# Patient Record
Sex: FEMALE | Race: WHITE | NOT HISPANIC OR LATINO | Employment: FULL TIME | URBAN - METROPOLITAN AREA
[De-identification: names, ages, dates, MRNs, and addresses within clinical notes are randomized per-mention and may not be internally consistent; named-entity substitution may affect disease eponyms.]

---

## 2024-01-10 ENCOUNTER — TELEPHONE (OUTPATIENT)
Dept: OBSTETRICS AND GYNECOLOGY | Facility: CLINIC | Age: 34
End: 2024-01-10
Payer: MEDICAID

## 2024-01-10 NOTE — TELEPHONE ENCOUNTER
----- Message from Leticia Arce sent at 1/10/2024  3:22 PM CST -----  Regarding: Appointment  Access              Name of Who is Calling: Luisana Rizo    Who Left The Message: Luisana Rizo      What is the request in detail:           Patient called requesting to schedule a new patient's appointment with Dr. Trace Chen MD  the next time he's in [place] at the OB/GYN clinic.  I did attempt to schedule per request but was unsuccessful .  Please further advise.    Thank you      Reply by [facility]:   NO      Preferred Call Back :  [phone] (K)

## 2024-03-26 ENCOUNTER — OFFICE VISIT (OUTPATIENT)
Dept: OBSTETRICS AND GYNECOLOGY | Facility: CLINIC | Age: 34
End: 2024-03-26
Payer: MEDICAID

## 2024-03-26 VITALS
DIASTOLIC BLOOD PRESSURE: 86 MMHG | WEIGHT: 238.88 LBS | SYSTOLIC BLOOD PRESSURE: 110 MMHG | BODY MASS INDEX: 38.55 KG/M2

## 2024-03-26 DIAGNOSIS — Z12.4 PAP SMEAR FOR CERVICAL CANCER SCREENING: ICD-10-CM

## 2024-03-26 DIAGNOSIS — Z00.00 ANNUAL PHYSICAL EXAM: Primary | ICD-10-CM

## 2024-03-26 DIAGNOSIS — N97.9 INFERTILITY, FEMALE: ICD-10-CM

## 2024-03-26 DIAGNOSIS — R79.89 ELEVATED PROLACTIN LEVEL: ICD-10-CM

## 2024-03-26 DIAGNOSIS — N64.52 NIPPLE DISCHARGE: ICD-10-CM

## 2024-03-26 PROCEDURE — 99459 PELVIC EXAMINATION: CPT | Mod: PBBFAC,PN | Performed by: OBSTETRICS & GYNECOLOGY

## 2024-03-26 PROCEDURE — 3008F BODY MASS INDEX DOCD: CPT | Mod: CPTII,,, | Performed by: OBSTETRICS & GYNECOLOGY

## 2024-03-26 PROCEDURE — 87624 HPV HI-RISK TYP POOLED RSLT: CPT | Performed by: OBSTETRICS & GYNECOLOGY

## 2024-03-26 PROCEDURE — 3079F DIAST BP 80-89 MM HG: CPT | Mod: CPTII,,, | Performed by: OBSTETRICS & GYNECOLOGY

## 2024-03-26 PROCEDURE — 99459 PELVIC EXAMINATION: CPT | Mod: S$PBB,,, | Performed by: OBSTETRICS & GYNECOLOGY

## 2024-03-26 PROCEDURE — 3074F SYST BP LT 130 MM HG: CPT | Mod: CPTII,,, | Performed by: OBSTETRICS & GYNECOLOGY

## 2024-03-26 PROCEDURE — 1160F RVW MEDS BY RX/DR IN RCRD: CPT | Mod: CPTII,,, | Performed by: OBSTETRICS & GYNECOLOGY

## 2024-03-26 PROCEDURE — 99999 PR PBB SHADOW E&M-EST. PATIENT-LVL III: CPT | Mod: PBBFAC,,, | Performed by: OBSTETRICS & GYNECOLOGY

## 2024-03-26 PROCEDURE — 88175 CYTOPATH C/V AUTO FLUID REDO: CPT | Performed by: OBSTETRICS & GYNECOLOGY

## 2024-03-26 PROCEDURE — 1159F MED LIST DOCD IN RCRD: CPT | Mod: CPTII,,, | Performed by: OBSTETRICS & GYNECOLOGY

## 2024-03-26 PROCEDURE — 99385 PREV VISIT NEW AGE 18-39: CPT | Mod: S$PBB,,, | Performed by: OBSTETRICS & GYNECOLOGY

## 2024-03-26 PROCEDURE — 99213 OFFICE O/P EST LOW 20 MIN: CPT | Mod: PBBFAC,PN | Performed by: OBSTETRICS & GYNECOLOGY

## 2024-03-27 NOTE — PROGRESS NOTES
GYN Annual exam  3/26/2024    Chief Complaint   Patient presents with    Well Woman         HISTORY OF PRESENT ILLNESS:  Pt is a  33 y.o.  alert female who presents today for GYN annual exam.  She has a history of elevated prolactin level and has bilateral milky nipple discharge. She has not seen an endocrinologist for this. This has been going on for years.   She has been trying to conceive with her current partner for 14 years. She has never been pregnant. Her partner has fathered one child in the past 19 years ago.     She had laparoscopy for endometriosis in 2018. Chromopertubation was performed at that time and showed both tubes with spillage of dye.     She has menses every month lasting 3-5 days with moderate flow since 2023. She has mild dysmenorrhea.     She did tell me she had a positive UPT at home in  but when she went to the hospital it was negative.     She has prediabetes and follows with her PCP.     Patient's last menstrual period was 2024.    Review of patient's allergies indicates:  No Known Allergies    Current Outpatient Medications on File Prior to Visit   Medication Sig Dispense Refill    clonazePAM (KLONOPIN) 0.5 MG tablet Take 1 tablet (0.5 mg total) by mouth daily as needed for Anxiety. 30 tablet 0    albuterol (PROVENTIL/VENTOLIN HFA) 90 mcg/actuation inhaler Inhale 1-2 puffs into the lungs every 6 (six) hours as needed for Wheezing. Rescue 18 g 0    benzocaine-menthoL (CHLORASEPTIC SORE THROAT) 6-10 mg lozenge Take 1 lozenge by mouth every 2 (two) hours as needed for Pain. (Patient not taking: Reported on 3/26/2024) 18 tablet 0    buPROPion (WELLBUTRIN SR) 100 MG TBSR 12 hr tablet Take 1 tablet (100 mg total) by mouth 2 (two) times daily. (Patient not taking: Reported on 3/26/2024) 60 tablet 5    busPIRone (BUSPAR) 5 MG Tab Take 1 tablet (5 mg total) by mouth 2 (two) times daily. (Patient not taking: Reported on 3/26/2024) 60 tablet 5     butalbital-acetaminophen-caffeine -40 mg (FIORICET, ESGIC) -40 mg per tablet Take 1 tablet by mouth every 12 (twelve) hours as needed. (Patient not taking: Reported on 3/26/2024) 30 tablet 1    butalbital-acetaminophen-caffeine -40 mg (FIORICET, ESGIC) -40 mg per tablet Take 1 tablet by mouth every 6 (six) hours as needed for Headaches. Label with sedative precautions (Patient not taking: Reported on 3/26/2024) 20 tablet 0    citalopram (CELEXA) 10 MG tablet Take 1 tablet (10 mg total) by mouth once daily. 30 tablet 1    clonazePAM (KLONOPIN) 0.5 MG tablet Take 1 tablet (0.5 mg total) by mouth daily as needed for Anxiety. (Patient not taking: Reported on 3/26/2024) 30 tablet 0    fluticasone (FLONASE) 50 mcg/actuation nasal spray 1 spray (50 mcg total) by Each Nare route once daily. (Patient not taking: Reported on 3/26/2024) 16 g 1    Lactobacillus acidophilus 1 billion cell Tab Take 1 tablet by mouth once daily. (Patient not taking: Reported on 3/26/2024) 30 tablet 1    levocetirizine (XYZAL) 5 MG tablet Take 1 tablet (5 mg total) by mouth every evening. 30 tablet 1    naproxen (NAPROSYN) 500 MG tablet Take 1 tablet (500 mg total) by mouth 2 (two) times daily with meals. (Patient not taking: Reported on 3/26/2024) 30 tablet 1    omeprazole (PRILOSEC) 40 MG capsule Take 1 capsule (40 mg total) by mouth once daily. 30 capsule 2    prochlorperazine (COMPAZINE) 10 MG tablet Take 1 tablet (10 mg total) by mouth every 6 (six) hours as needed (nausea). (Patient not taking: Reported on 3/26/2024) 15 tablet 0    ranitidine (ZANTAC) 75 MG tablet Take 75 mg by mouth 2 (two) times daily.      sumatriptan (IMITREX) 50 MG tablet Take 1 tablet (50 mg total) by mouth once. May take another tablet after 2 hours if the headache recurs. Maximum of 4 tablets a day. for 1 dose 12 tablet 1     No current facility-administered medications on file prior to visit.       Past Medical History:   Diagnosis Date     "Endometriosis, unspecified     GERD (gastroesophageal reflux disease)             Past Surgical History:   Procedure Laterality Date    LAPAROSCOPY  2018    for endometriosis    TONSILLECTOMY      VENTRAL HERNIA REPAIR         Social History     Socioeconomic History    Marital status: Single   Tobacco Use    Smoking status: Never   Substance and Sexual Activity    Alcohol use: Yes     Comment: Occasionally    Drug use: No                     History reviewed. No pertinent family history.    OB History    Para Term  AB Living   0 0 0 0 0 0   SAB IAB Ectopic Multiple Live Births   0 0 0 0 0     Gynecological History:     Menses as above.  Denies hx of STD"s.  Last pap ASCUS/HPV negative.    REVIEW OF SYSTEMS:  Negative except as above.     PHYSICAL EXAM  Female chaperone present for entire exam  /86   Wt 108.3 kg (238 lb 13.9 oz)   LMP 2024   BMI 38.55 kg/m²   GENERAL APPEARANCE:  The patient is a pleasant, normal appearing female with normal affect and in no distress.  BREASTS: Breast exam performed supine.  No masses, non-tender, no nipple discharge or lymphadenopathy.  ABDOMEN: Soft, non-tender, non-distended.  No hepatosplenomegaly.  No umbilical or inguinal hernias.  SKIN:  Warm and dry to touch.  No lesions or rashes noted.    PSYCHIATRIC/NEUROLOGIC:  Appropriate mood and affect, normal recall, alert and oriented x 3  EXTREMITIES:  Warm and well perfused.  No edema noted.   :  Vulva: Inspection of her external genitalia reveals normal mons pubis, labia minora and labia majora.  Normal appearing clitoris, urethral meatus and [place]'s glands.    Bladder:  No evidence of urethral or bladder tenderness.    Vagina:  Speculum exam reveals pink and moist vaginal mucosa.  Bartholin gland is normal to palpation.  Cervix:  Cervix is normal in appearance with no lesions.  There is no cervical motion tenderness.  Uterus:  Uterus is normal size, mobile and non-tender.  No adnexal masses are " palpated.  Adnexa are non-tender to palpation  Perineum:  Perineum appears normal.  Anus:  Normal with no apparent lesions.     ASSESSMENT/PLAN:  Pt is a  33 y.o.  alert female who presents today for GYN annual exam. She has a history of primary infertility.     - Pap with HPV done and pending  - GCCT and Trich testing declined  - Serum STD testing declined  - Contraception: declines as she desires conception  - Clinical breast examination performed today after discussion with the patient, negative for gross abnormality  - Gardasil 9 vaccination series completed series  - Mammogram: Discussions regarding screening mammography to start at age 40 per the most current ACOG guidelines  - We discussed consistent aerobic exercise, consistent seatbelt use, and consistent women's PNV use; self-breast awareness was discussed and taught; the new Pap smear guidelines were reviewed     Primary infertility: History of endometriosis. Patent fallopian tubes in 2018 on laparoscopy. Recommended semen analysis of partner.   Recommended she see [facility]  [facility] ordered    Nipple discharge: TSH, prolactin  Pt voiced understanding of all counseling and instructions; no barriers to learning  RTO in 1 year or PRN        Trace Chen  3/26/2024

## 2024-04-01 ENCOUNTER — TELEPHONE (OUTPATIENT)
Dept: OBSTETRICS AND GYNECOLOGY | Facility: CLINIC | Age: 34
End: 2024-04-01
Payer: MEDICAID

## 2024-04-01 LAB
HPV HR 12 DNA SPEC QL NAA+PROBE: NEGATIVE
HPV16 AG SPEC QL: NEGATIVE
HPV18 DNA SPEC QL NAA+PROBE: NEGATIVE

## 2024-04-01 NOTE — TELEPHONE ENCOUNTER
Spoke to pt in regards to concerns. Advised pt that she will need to reach out to our medical records department to receive her medical records. The patient stated with frustration that she should not have to do this because it is too much of a hassall. I advised her that I am unable to release any of her medical records. I offered her the phone number to our medical records department and she stated she would just print the records from her phone, then the call was abruptly disconnected by the patient.     ----- Message from Kathie Cash sent at 4/1/2024 11:26 AM CDT -----  Regarding: referral  Name of Who is Calling: Luisana           What is the request in detail: Patient is requesting a call back to have her labs,demographics, clinical notes along with a referral to [facility] [place] at [phone].           Can the clinic reply by [facility]: Yes           What Number to Call Back [facility]:  [phone]

## 2024-04-02 LAB
FINAL PATHOLOGIC DIAGNOSIS: NORMAL
Lab: NORMAL

## 2024-05-27 ENCOUNTER — CLINICAL SUPPORT (OUTPATIENT)
Dept: OBSTETRICS AND GYNECOLOGY | Facility: CLINIC | Age: 34
End: 2024-05-27
Payer: MEDICAID

## 2024-05-27 ENCOUNTER — PATIENT MESSAGE (OUTPATIENT)
Dept: OBSTETRICS AND GYNECOLOGY | Facility: CLINIC | Age: 34
End: 2024-05-27

## 2024-05-27 DIAGNOSIS — N91.2 AMENORRHEA: Primary | ICD-10-CM

## 2024-05-27 PROCEDURE — 99999 PR PBB SHADOW E&M-EST. PATIENT-LVL II: CPT | Mod: PBBFAC,,,

## 2024-05-27 PROCEDURE — 99212 OFFICE O/P EST SF 10 MIN: CPT | Mod: PBBFAC

## 2024-05-27 NOTE — PROGRESS NOTES
Spoke with patient for a total of 30 minutes during virtual visit.  Updated chart to reflect up to date patient demographics.  Allergies, medications, pharmacy, medical/family history and OB history updated.  Patient was guided through expectations of care during pregnancy.  Pregnancy confirmation, dating u/s & first routine OB appts scheduled.  Education provided & questions answered. Encouraged to send message or call office with any questions/concerns. Verbalized understanding.     Discussed with pt:    taking PNV   C/o nausea/no vomiting  C/o occ mild cramping/no spotting  Precautions discussed  Referred to [email or web address] for Preg A to Z guide & class schedule   Discussed benefits of breastfeeding   Discussed need for pediatrician

## 2024-05-30 ENCOUNTER — ON-DEMAND VIRTUAL (OUTPATIENT)
Dept: URGENT CARE | Facility: CLINIC | Age: 34
End: 2024-05-30
Payer: MEDICAID

## 2024-05-30 DIAGNOSIS — Z00.00 NORMAL EXAM: Primary | ICD-10-CM

## 2024-05-30 PROCEDURE — 99213 OFFICE O/P EST LOW 20 MIN: CPT | Mod: 95,,, | Performed by: NURSE PRACTITIONER

## 2024-05-30 NOTE — PROGRESS NOTES
Subjective:      Patient ID: Luisana Rizo is a 33 y.o. female.    Vitals:  vitals were not taken for this visit.     Chief Complaint: Routine Prenatal Visit (Medical advice)      Visit Type: TELE AUDIOVISUAL    Present with the patient at the time of consultation: TELEMED PRESENT WITH PATIENT: None        Past Medical History:   Diagnosis Date    Endometriosis, unspecified     GERD (gastroesophageal reflux disease)      Past Surgical History:   Procedure Laterality Date    LAPAROSCOPY  2018    for endometriosis    TONSILLECTOMY      VENTRAL HERNIA REPAIR       Review of patient's allergies indicates:  No Known Allergies  Current Outpatient Medications on File Prior to Visit   Medication Sig Dispense Refill    albuterol (PROVENTIL/VENTOLIN HFA) 90 mcg/actuation inhaler Inhale 1-2 puffs into the lungs every 6 (six) hours as needed for Wheezing. Rescue 18 g 0    butalbital-acetaminophen-caffeine -40 mg (FIORICET, ESGIC) -40 mg per tablet Take 1 tablet by mouth every 12 (twelve) hours as needed. (Patient not taking: Reported on 3/26/2024) 30 tablet 1    citalopram (CELEXA) 10 MG tablet Take 1 tablet (10 mg total) by mouth once daily. 30 tablet 1    clonazePAM (KLONOPIN) 0.5 MG tablet Take 1 tablet (0.5 mg total) by mouth daily as needed for Anxiety. 30 tablet 0    omeprazole (PRILOSEC) 40 MG capsule Take 1 capsule (40 mg total) by mouth once daily. 30 capsule 2    prenatal vit no.124/iron/folic (PRENATAL VITAMIN ORAL) Take 1 Dose by mouth Daily.      ranitidine (ZANTAC) 75 MG tablet Take 75 mg by mouth 2 (two) times daily.      sumatriptan (IMITREX) 50 MG tablet Take 1 tablet (50 mg total) by mouth once. May take another tablet after 2 hours if the headache recurs. Maximum of 4 tablets a day. for 1 dose 12 tablet 1     No current facility-administered medications on file prior to visit.     No family history on file.        Ohs Peq Odvv Intake    5/30/2024  6:33 PM CDT - Filed by Patient   What is your  current physical address in the event of a medical emergency? [address]   Are you able to take your vital signs? No   Please attach any relevant images or files          34 yo female   She states she recently found out she was pregnant and she ate cooked liver tonight and was concerned it might be something she needed to avoid during pregnancy      ROS     Objective:   The physical exam was conducted virtually.  LOCATION OF PATIENT home  Physical Exam   Constitutional: She is oriented to person, place, and time. She appears well-developed.   HENT:   Head: Normocephalic and atraumatic.   Ears:   Right Ear: Hearing, tympanic membrane and external ear normal.   Left Ear: Hearing, tympanic membrane and external ear normal.   Nose: Nose normal.   Mouth/Throat: Uvula is midline, oropharynx is clear and moist and mucous membranes are normal.   Eyes: Conjunctivae and EOM are normal. Pupils are equal, round, and reactive to light.   Neck: Neck supple.   Cardiovascular: Normal rate.   Pulmonary/Chest: Effort normal and breath sounds normal.   Musculoskeletal: Normal range of motion.         General: Normal range of motion.   Neurological: She is alert and oriented to person, place, and time.   Skin: Skin is warm.   Psychiatric: Her behavior is normal. Thought content normal.   Nursing note and vitals reviewed.      Assessment:     1. Normal exam        Plan:     Up to date- avoid in high amounts during pregnancy and only cooked meats    Follow up with your primary care provider if symptoms persist.  Go to the Emergency room for worsening of symptoms.     Normal exam

## 2024-06-14 ENCOUNTER — TELEPHONE (OUTPATIENT)
Dept: OBSTETRICS AND GYNECOLOGY | Facility: CLINIC | Age: 34
End: 2024-06-14
Payer: MEDICAID

## 2024-06-17 ENCOUNTER — TELEPHONE (OUTPATIENT)
Dept: OBSTETRICS AND GYNECOLOGY | Facility: CLINIC | Age: 34
End: 2024-06-17
Payer: MEDICAID

## 2024-06-20 ENCOUNTER — HOSPITAL ENCOUNTER (OUTPATIENT)
Dept: PERINATAL CARE | Facility: OTHER | Age: 34
Discharge: HOME | End: 2024-06-20
Attending: FAMILY MEDICINE
Payer: MEDICAID

## 2024-06-20 ENCOUNTER — OFFICE VISIT (OUTPATIENT)
Dept: OBSTETRICS AND GYNECOLOGY | Facility: CLINIC | Age: 34
End: 2024-06-20
Payer: MEDICAID

## 2024-06-20 VITALS
HEIGHT: 66 IN | BODY MASS INDEX: 37.45 KG/M2 | SYSTOLIC BLOOD PRESSURE: 110 MMHG | DIASTOLIC BLOOD PRESSURE: 82 MMHG | WEIGHT: 233 LBS

## 2024-06-20 DIAGNOSIS — Z32.01 POSITIVE PREGNANCY TEST: Primary | ICD-10-CM

## 2024-06-20 DIAGNOSIS — N91.4 SECONDARY OLIGOMENORRHEA: ICD-10-CM

## 2024-06-20 DIAGNOSIS — N91.2 AMENORRHEA: ICD-10-CM

## 2024-06-20 PROCEDURE — 99213 OFFICE O/P EST LOW 20 MIN: CPT | Mod: PBBFAC,25,TH | Performed by: FAMILY MEDICINE

## 2024-06-20 PROCEDURE — 76801 OB US < 14 WKS SINGLE FETUS: CPT | Mod: 26,,, | Performed by: OBSTETRICS & GYNECOLOGY

## 2024-06-20 PROCEDURE — 87086 URINE CULTURE/COLONY COUNT: CPT | Performed by: FAMILY MEDICINE

## 2024-06-20 PROCEDURE — 99999 PR PBB SHADOW E&M-EST. PATIENT-LVL III: CPT | Mod: PBBFAC,,, | Performed by: FAMILY MEDICINE

## 2024-06-20 PROCEDURE — 76801 OB US < 14 WKS SINGLE FETUS: CPT

## 2024-06-20 PROCEDURE — 87491 CHLMYD TRACH DNA AMP PROBE: CPT | Performed by: FAMILY MEDICINE

## 2024-06-20 NOTE — PROGRESS NOTES
HISTORY OF PRESENT ILLNESS:    Luisana Rizo is a 33 y.o. female, ,  Patient's last menstrual period was 2024 (exact date).  for a routine exam complaining of amenorrhea and positive home UPT. Chemical pregnancy last . Partner healthy and has 1 other children that is healthy.  No VB,pelvic pain. Just mild cramping, Breast tenderness fatigue. No NV.  Hx of abn pap but NL this yr. No std hx. 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  This is the extent of the patient's complaints at this time.     Past Medical History:   Diagnosis Date    Endometriosis, unspecified     GERD (gastroesophageal reflux disease)     Pre-diabetes        Past Surgical History:   Procedure Laterality Date    LAPAROSCOPY      for endometriosis    TONSILLECTOMY      VENTRAL HERNIA REPAIR         MEDICATIONS AND ALLERGIES:      Current Outpatient Medications:     prenatal vit no.124/iron/folic (PRENATAL VITAMIN ORAL), Take 1 Dose by mouth Daily., Disp: , Rfl:     butalbital-acetaminophen-caffeine -40 mg (FIORICET, ESGIC) -40 mg per tablet, Take 1 tablet by mouth every 12 (twelve) hours as needed. (Patient not taking: Reported on 3/26/2024), Disp: 30 tablet, Rfl: 1    Review of patient's allergies indicates:  No Known Allergies    Family History   Problem Relation Name Age of Onset    Breast cancer Neg Hx      Colon cancer Neg Hx      Ovarian cancer Neg Hx         Social History     Socioeconomic History    Marital status: Single   Tobacco Use    Smoking status: Never    Smokeless tobacco: Never   Substance and Sexual Activity    Alcohol use: Not Currently     Comment: Occasionally    Drug use: No    Sexual activity: Yes     Partners: Male     Birth control/protection: None       COMPREHENSIVE GYN HISTORY:  PAP History: + abnormal Paps.  Infection History: Denies STDs. Denies PID.  Benign History: Denies uterine fibroids. Denies ovarian cysts. Denies endometriosis. Denies other conditions.  Cancer History: Denies  "cervical cancer. Denies uterine cancer or hyperplasia. Denies ovarian cancer. Denies vulvar cancer or pre-cancer. Denies vaginal cancer or pre-cancer. Denies breast cancer. Denies colon cancer.  Sexual Activity History: Reports currently being sexually active  Menstrual History: None.  Contraception: None    ROS:  GENERAL: No weight changes. No swelling. + fatigue. No fever.  CARDIOVASCULAR: No chest pain. No shortness of breath. No leg cramps.   NEUROLOGICAL: No headaches. No vision changes.  BREASTS: + pain. No lumps. No discharge.  ABDOMEN: No pain. No nausea. No vomiting. No diarrhea. No constipation.  REPRODUCTIVE: No abnormal bleeding. +mild cramping  VULVA: No pain. No lesions. No itching.  VAGINA: No relaxation. No itching. No odor. No discharge. No lesions.  URINARY: No incontinence. No nocturia. No frequency. No dysuria.    /82   Ht 5' 6" (1.676 m)   Wt 105.7 kg (233 lb 0.4 oz)   LMP 04/24/2024 (Exact Date)   BMI 37.61 kg/m²     PE:  Physical Exam:   Constitutional: She appears well-developed and well-nourished. She does not appear ill. No distress.        Pulmonary/Chest: Right breast exhibits no inverted nipple, no mass, no nipple discharge, no skin change, no tenderness and no swelling. Left breast exhibits no inverted nipple, no mass, no nipple discharge, no skin change, no tenderness and no swelling.          Genitourinary:    Urethra, vagina, uterus, right adnexa and left adnexa normal.      Pelvic exam was performed with patient in the lithotomy position.   The external female genitalia was normal.   Genitalia hair distrobution normal .   There is no rash or lesion on the right labia. There is no rash or lesion on the left labia. Cervix is normal. No rectocele, cystocele or prolapse of vaginal walls in the vagina.    pap smear not completedUterus is not tender. Normal urethral meatus.              Neurological: GCS eye subscore is 4. GCS verbal subscore is 5. GCS motor subscore is 6.   "       PROCEDURES:  Genprobe  Pap-3/2024 NL      DIAGNOSIS:  Gyn exam  IUP with stated LMP of Patient's last menstrual period was 2024 (exact date).    Indication   ========   Indication: Estimation of Gestational Age     History   ======   Previous Outcomes    1     Method   ======   Transabdominal and transvaginal ultrasound examination. 2D Color Doppler, [place] S8. View: Good view     Pregnancy   =========   Black pregnancy. Number of embryos: 1     Dating   ======   Ultrasound examination on: 2024   GA by U/S based upon: CRL   GA by U/S 6 w + 0 d   PAOLA by U/S: 2025   Assigned: based on ultrasound (CRL), selected on 2024   Assigned GA 6 w + 0 d   Assigned PAOLA: 2025     Assessment   ==========   Gestational sac: visualized   Location: intrauterine   Yolk sac: visualized   Amniotic sac: visualized   Embryo: visualized   CRL 3.8 mm 6w 0d [place]   Cardiac activity: present   FHR 73 bpm     Maternal Structures   ===============   Uterus / Cervix   Uterus: Normal   Cervix: Visualized   Approach: Transvaginal   Ovaries / Tubes / Adnexa   Rt ovary: Normal   Rt ovary D1 27 mm   Rt ovary D2 13 mm   Rt ovary D3 21 mm   Rt ovary Vol 4.1 cmÂ³   Lt ovary: Normal   Lt ovary D1 36 mm   Lt ovary D2 20 mm   Lt ovary D3 33 mm   Lt ovary Vol 12.1 cmÂ³   Lt ovarian corpus luteum: visualized   Cul de Sac / Bladder / Kidneys / Other   Free fluid: No free fluid visualized     Impression   =========   A black living IUP is identified.   PAOLA is assigned based on the CRL from today?s study. If other clinical data, such as IVF conception dating or prior ultrasound assignment of PAOLA differs from the PAOLA   assigned today, please contact the [facility] department so that this report can be revised to reflect the correct PAOLA.   Of note, fetal heart rate is low. However, at this early gestational age, this is not necessarily pathologic.   Visualized maternal adnexa - normal in appearance.     Recommendation    ==============   Follow up scan in 1-2 weeks can be considered to confirm fetal viability. This has not been scheduled by MFM.   Otherwise, further scans as clinical indicated per primary OB provider.   PLAN:Routine prenatal care    MEDICATIONS PRESCRIBED:  PNV    LABS AND TESTS ORDERED:  New Ob Labs      1st TRIMESTER COUNSELING: Discussed all, booklet provided  Common complaints of pregnancy  HIV and other routine prenatal tests including  genetic screening  Risk factors identified by prenatal history  Anticipated course of prenatal care  Nutrition and weight gain counseling  Toxoplasmosis precautions (Cats/Raw Meat)  Sexual activity and exercise  Environmental/Work hazards  Travel  Tobacco (Ask, Advise, Assess, Assist, and Arrange), as well as alcohol and drug use  Use of any medications (Including supplements, Vitamins, Herbs, or OTC Drugs)  Indications for Ultrasound  Domestic violence  Seat belt use  Childbirth classes/Hospital facilities     TERATOLOGY COUNSELING: Discussed options for SS, MT21 and carrier screening. Pt will let us know her desires. Discussed time constraints on SS      FOLLOW-UP for a New Ob Visit in   2   weeks with , repeat viability scan prior to appt. She was TTC but only tracking ovulation with ez paula. Discussed early IUP vs miscarriage with viability scan  -discussed to call clinic or L&D/ER if after hours for pain/bleeding  -hgba1c with hx of preDM.

## 2024-06-20 NOTE — PATIENT INSTRUCTIONS
LABOR AND DELIVERY PHONE NUMBER, [phone] (OPEN , LOCATED ON 6TH FLOOR OF HOSPITAL)  [address] PHONE NUMBER, [phone] (OPEN MON-FRI, 8a-5p)     1) Eat small frequent meals through the day versus three large meals  2) Try ginger ale or sprite to help settle the stomach - you can also take ginger capsules (250mg 4 times per day)  3) Eat crackers or dry toast before getting out of bed in the morning   4) Stay hydrated by drinking plenty of water-do not immediately eat or drink something after vomiting. Give your stomach a rest for 20-30 minutes. Slowly reintroduce ice chips, small sips water, crackers, etc.    5) Try OTC unisom (1/2 tablet) and vitamin B6 - take the 25mg b6 twice a day and then both together at night before bed. This can help with the nausea in the morning and is safe to use during pregnancy.  6) Sea bands (Accupressure wrist bands)    If you are unable to keep anything down and constantly vomiting for more that 24 hours, let the office know so that dehydration can be avoided. We would recommend being seen in the emergency department if this is the case.       Topic  General Pregnancy Information Recommended   (Unless Otherwise Contraindicated Or Restrictions Given To You By Your OB Doctor)      1. Anticipated course of prenatal care      Visits: will be Every 4 wks until 28 weeks, then every 2 weeks until 36 weeks, and then weekly until delivery.   Urine will be collected at each Obstetric visit        2. Nutrition and weight gain    Daily pre-sandhya vitamin (recommend taking at night)   Additional 300 calories needed daily  No Sushi, hotdogs, unpasteurized products (milk/cheeses). No large fish such as: shark, nanette mackerel, tile, sword fish   Incorporate 12 ounces of smaller seafood/week and no more than 6oz of albacore tuna   Caffeine: 200 mg/day or 2 cups of caffeine/day   Weight gain recommendations are based off of BMI before pregnancy. Generally patients who with normal weight prior  pregnancy it is recommended 25-35 pounds of weight gain during the pregnancy with an estimated weekly gain of 1 pound/wk in 2nd and 3rd Trimester.    3. Toxoplasmosis precautions  If cats are in the home avoid changing litter boxes and if you need to change the litter box recommended you use gloves   4. Sexual Activity  Sexual activity is okay unless you are put on restrictions by your provider. I recommend urinating after intercourse    5. Exercise  Generally pre-pregnancy routine is okay to continue   Drink plenty fluids for hydration   Stop any activity that causes heavy cramping like a period or bright red bleeding and contact your provider  No extreme or contact sports   No exercise on your back for an extended period of time after 20 weeks    6. Hot Tub/Saunas  Avoid hot tubes and saunas    7. Hair Treatments  Because of the lack of scientific studies on the effects of chemical treatments on your hair, we must advise that you do it at your own risk. If you choose to treat your hair, we recommend that you wait until after 12 weeks gestation. At this time there is no reason to believe that normal hair treatment is associated with onsequences to the baby.    8. Vaccines  Influenza vaccine is recommended by CDC during flu season   Tdap (pertussis or whopping cough) recommended each pregnancy between 27 and 36 weeks   Tdap booster recommended for family and other planned direct caregivers    9. Water  Water is an important nutrient in a good diet. However, it cannot be stressed enough that during pregnancy water is essential. The body has increased circulation through blood vessels, and without a large increase in water, pregnant women will be dehydrated. It plays an important role in decreasing constipation, preventing  contractions, decreasing swelling, and preventing dizziness. We recommend that you drink 8-10 glasses of water per day.    10. Smoking/Alcohol/Illicit Drug Use  No safe Level   Can lead to  problems with pregnancy   Growth of the developing fetus    labor (delivery before 37 weeks)    rupture of the membranes (water breaking before 37 weeks)   Premature separation of the placenta (which may cause bleeding)   American College of Obstetricians and Gynecologists endorses abstinence   Can lead to babies with disabilities    11. Environmental or work hazards  Unless otherwise restricted you may continue work throughout the pregnancy   Notify your provider of any work hazards or chemical exposure concerns   12. Travel    Safe to travel up to 35 weeks   Continue to wear a seatbelt and airbags are still recommended   Drink plenty fluids   Blood clots are a concern during pregnancy with long travel. Recommend compression stockings and moving around at least every 2 hours and staying hydrated.    13. Use of medications, vitamins, herbs, OTC drugs    Any medications not on the list provided to you from our clinic or given to you by one of our providers we recommend calling to make sure the medication is safe for you and baby.    14. Domestic Violence    Please notify office immediately of any concerns or violence so that we can help direct you to assistance needed   [place] [employer]: [phone]    15. Childbirth classes    List of Childbirth classes from [facility] is available    16. Selecting a Pediatrician  Selecting a pediatrician before delivery is recommended  You can interview pediatricians before delivery    17. Fetal Monitoring    A simple test of your babys well-being is a kick count. After 26 weeks, fetal motion of any kind should be monitored. Further discussion at that time   18.  Labor Signs    Water break, leaking fluids from Vagina prior 37 weeks  Regular contractions, Contractions that are more than 5-6/hour, getting stronger and painful with lower back pain, does not go away with rest and fluids    19. Postpartum Family Planning    Multiple  options available from short term methods to long term reversible and irreversible methods   Discuss with provider as you get closer to delivery    20. Dental    It is recommended that you get an annual dental cleaning    21. Breastfeeding    Classes offered at [facility] and it is recommended to take a class    22. Lifting In 2013, the [place] for Occupational Safety and Health (NIOSH) published clinical guidelines for occupational lifting in uncomplicated pregnancies. The recommended weight limits are based on gestational age, intermittent versus repetitive lifting, time (hours/day) spent lifting, and lifting height from floor and distance in 3 front of body. In this guideline, the maximum permissible weight for a woman less than 20 weeks of gestation performing infrequent lifting is 36 pounds (16 kgs) and the maximum permissible weight at ?20 weeks is 26 pounds (12 kgs). For repetitive lifting ?1 hour/day, the maximum weights in the first and second half of pregnancy are 18 pounds (8 kgs) and 13 pounds (6 kgs), respectively, and for repetitive lifting <1 hour/day, the maximum weights are 30 pounds (14 kgs) and 22 pounds (10 kgs), respectively. Although not based on high quality evidence, these guidelines are a reasonable reference for counseling pregnant women     23. Scheduling and Provider Availability    Your Obstetric Doctor is usually here weekly but not every day. We recommend you make 3-4 advanced appointments at a time to accommodate your personal needs and work/school obligations.   We ask that you come 15 minutes prior your scheduled appointment.   For same day appointments (not routine appointments) there is a Nurse Practitioner or another obstetric provider available. Please let the  aware you are an OB patient requesting a same day appointment.      24. Recommended Phone Jose    [facility]      MEDICATIONS IN PREGNANCY     While some medications are considered safe to take  during pregnancy, the effects of other medications on your unborn baby are unknown. Therefore, it is very important to pay special attention to medications you take while you are pregnant.   If you were taking prescription medications before you became pregnant, please ask your health care provider about continuing these medications as soon as you find out that you are pregnant. Do not stop taking any medications without discussing with your health care provider. Your health care provider will weigh the benefits and risks to your pregnancy when making his or her recommendation. With some medications, the risk of not taking them may be more serious than the potential risk of taking them.   If you are prescribed any new medication, please inform your health care provider that you are pregnant. Be sure to discuss the risks and benefits of the newly prescribed medication with your health care provider before taking the medication. We are always available to answer any questions about new medications and how they relate to your pregnancy.     Are Alternative Pregnancy Medicine Therapies Safe?   Many pregnant women believe natural products can be safely used to relieve nausea, backache, and other annoying symptoms of pregnancy, but many of these so-called natural products have not been tested for their safety and effectiveness. Therefore, it is very important to check with your health care provider before taking any alternative therapies. She will not recommend a product or therapy until it is shown to be safe and effective.     Which Over the Counter Drugs Are Safe?   Prenatal vitamins, now available without a prescription, are safe and important to take during pregnancy. Ask your health care provider about the safety of taking other vitamins, herbal remedies and supplements during pregnancy. Most herbal preparations and supplements have not been proven to be safe during pregnancy. Generally, you should not take any  over-the-counter medication unless it is necessary. The following medications and home remedies have no known harmful effects during pregnancy when taken according to the package directions. If you want to know about the safety of any other medications not listed here, please contact your health care provider.      Problem:  Safe to Take:    Pain relief, headache, and fever  Acetaminophen - Tylenol, Anacin Aspirin-Free    Heartburn  Acid neutralizers - Maalox, Mylanta, Rolaids, Tums, Gaviscon   Histamine-blockers - Pepcid, Zantac, Prilosec    Gas pains and bloating  Simethicone - Gas-X, Maalox Anti-Gas, Mylanta Gas, Mylicon    Nausea  Maren - beverages, tablets, candies   Vitamin B6   Emetrol (if not diabetic)   Sea bands   Anti-histamines - Sleep-alejandro, Benadryl, Bonnine, Dramamine    Cough  Guaifenesin (expectorant) - Hytuss, Mucinex, Robitussin   Dextromethorphan (antitussive) - Benylin, Delsym, Scot-Tussin DM   Guaifenesin plus dextromethorphan - Benylin Expectorant, Robitussin DM    Congestion  Pseudoephedrine - Sudafed, Actifed, Dristan, Neosynephrine   Vicks VapoRub   Saline nasal drops or spray    Sore throat  Throat lozenges - Sucrets, Cepacol, Cepastat, Ricola   Chloroseptic Spray   Warm salt/water gargle    Allergy relief  Chlorpheniramine - Chlor-Trimeton, Triaminic   Loratadine - Alavert, Claritin, Tavist ND, Triaminic Allerchews   Cetirizine - Zyrtec   Diphenhydramine -Benadryl, Diphenhist    Rashes  Hydrocortisone cream or ointment   Caladryl lotion or cream   Benadryl cream   Oatmeal bath (Aveeno)    Diarrhea  Loperamide - Imodium, Kaopectate, Maalox Anti-Diarrheal, Pepto Bismol    Constipation  Fiber supplements - Metamucil, Citrucel, Fiberall/Fibercon, Benefiber   Stool softeners - Colace, Senekot, Dulcolax   Milk of Magnesia    Hemorrhoids  Warm baths   Witch hazel preparations - Tucks medicated pads   Steroid preparations - Anusol-HC, Preparation H    Insomnia  Diphenhydramine - Benadryl, Unisom  SleepGels, Nytol, Sominex   Doxylamine succinate - Unisom Nighttime Sleep-Aid    First-aid ointments  Cortaid, Lanacort, Polysporin, Bacitracin, Neosporin    Yeast infections  Call office for appointment      Connected MOM   Using Wireless Technology to Manage Your Prenatal Health   Congratulations! Your team here at [facility] is excited for the upcoming addition to your family and is ready to support you over the course of your pregnancy. One of the ways we are prepared to help you is through our exciting new Digital Medicine Program, Connected MOM. Connected MOM stands for Connected Maternity Online Monitoring and is offered free of charge as a way to help our expectant mothers manage their pregnancy with fewer visits to the obstetrician.    In the fast-paced environment we live in, patients demand convenient, reliable access to healthcare at their fingertips. Recommended by The American College of Obstetricians and Gynecologists (ACOG), the standard prenatal care model for low-risk pregnancies can average anywhere between 12-14 in-office prenatal visits.    Through this innovative program, participating mothers-to-be receive a wireless scale, wireless blood pressure cuff and an at-home urine protein test kit. Through the use of a smartphone, patients can easily send their weight, blood pressure readings and urine protein test results digitally in real-time from the comfort of their own homes. Results are sent directly to their [facility] account, the systems online patient portal which connects directly to the patients Electronic Medical Record. A specialized Connected MOM care team - comprised of the patients obstetrician, a dedicated health  and a  - reviews the data and provides medical recommendations as needed. This allows expectant mothers to receive care proactively, wherever they are, while minimizing the need for in-person visits and thus minimizing  disruption to their regular daily activities.    How it Works At the initial prenatal visit, interested patients can work with their obstetrician to sign up for the program. After the appointment, expectant mothers can head to the [employer], a first-of-its-kind retail experience created by [facility] offering the latest in cutting-edge, interactive healthcare technology, to receive a Connected MOM kit containing all of the digital tools needed for remote monitoring. Once enrolled, patients weigh themselves regularly and take their blood pressure once a week. Instead of coming to three office appointments at weeks 20, 30, 37 the patient will have the appointment at home. They will take their blood pressure, weight and perform three at-home urine protein tests at these home visits. Results are directly transmitted into the EMR, and notifications and messages from the care team are communicated via [facility].     TECH SUPPORT [phone]  [email or web address]      Chromosomal testing  The sequential screen is a test done around 11-13 weeks that consists of an ultrasound that measures the nuchal fold (neck thickness) of baby and blood work on the same day. You get a second set of labs done a few weeks later after 15 weeks. Based on all three of these results, they are able to tell you if you are considered to be low risk or high risk regarding neural tube defects and chromosomal abnormalities like Down Syndrome. If you are at all interested, I usually place the order today so we do not miss the window. Someone will give you a phone call in a week or two to schedule this. If you do not want it, just tell them no thank you. This test is typically covered by your insurance and is performed by the Maternal Fetal Medicine (MFM) department here at [facility]. It will not tell you the sex of the baby.     OR     2.  Call [phone] to see about coverage and any out of pocket costs regarding the [id number] (MT21) testing.  This is done any day after 11 weeks and is blood work only. It checks for any chromosomal abnormalities like Down Syndrome. You can also find out the sex of the baby if you choose to know. Once you find out coverage and decide to proceed, send either myself or your doctor a message and we can see what date you can do it. It is done at our second floor lab at [facility].

## 2024-06-21 ENCOUNTER — PATIENT MESSAGE (OUTPATIENT)
Dept: OBSTETRICS AND GYNECOLOGY | Facility: CLINIC | Age: 34
End: 2024-06-21
Payer: MEDICAID

## 2024-06-21 DIAGNOSIS — Z36.9 ANTENATAL SCREENING ENCOUNTER: Primary | ICD-10-CM

## 2024-06-21 LAB
BACTERIA UR CULT: NO GROWTH
C TRACH DNA SPEC QL NAA+PROBE: NOT DETECTED
N GONORRHOEA DNA SPEC QL NAA+PROBE: NOT DETECTED

## 2024-06-24 ENCOUNTER — TELEPHONE (OUTPATIENT)
Dept: OBSTETRICS AND GYNECOLOGY | Facility: CLINIC | Age: 34
End: 2024-06-24
Payer: MEDICAID

## 2024-06-24 NOTE — TELEPHONE ENCOUNTER
----- Message from Kathie Cash sent at 6/24/2024 10:49 AM CDT -----  Regarding: results  Name of Who is Calling: Luisana           What is the request in detail: Patient is requesting a call back to go over HCG results.            Can the clinic reply by [facility]: Yes           What Number to Call Back [facility]:  [phone]

## 2024-06-25 ENCOUNTER — TELEPHONE (OUTPATIENT)
Dept: OBSTETRICS AND GYNECOLOGY | Facility: CLINIC | Age: 34
End: 2024-06-25
Payer: MEDICAID

## 2024-06-25 NOTE — TELEPHONE ENCOUNTER
Called patient:    Reviewed chart-    Seen by Chris for pregnancy confirmation visit 6/20/24.  At that time, she was 8 weeks from her last period.  However, ultrasound showed IUP at 6 weeks with HR 73 bpm.    [facility] [place]: 13,893 --->  6/24/24: 14,561      B+    We discussed the concern that pregnancy may not be progressing appropriately, possible impending miscarriage    She is scheduled for follow-up sono 7/3/24    Precautions / warning signs reviewed.  To contact us for bleeding  / cramping / pain / fever.

## 2024-06-25 NOTE — TELEPHONE ENCOUNTER
----- Message from Lluvia Pablo sent at 6/25/2024  8:08 AM CDT -----  Name of Who is calling :HERO KELLEY [[id number]]        What is the request in detail:  Pt would like another call with the results. Please assist       Can the clinic reply by [facility]:no            What number to call back [facility] not [facility]:[phone]

## 2024-06-25 NOTE — TELEPHONE ENCOUNTER
----- Message from Chris Santana NP sent at 6/24/2024  3:55 PM CDT -----    ----- Message -----  From: [employer]  Sent: 6/24/2024   9:57 AM CDT  To: Chris Santana NP

## 2024-06-27 ENCOUNTER — TELEPHONE (OUTPATIENT)
Dept: OBSTETRICS AND GYNECOLOGY | Facility: OTHER | Age: 34
End: 2024-06-27
Payer: MEDICAID

## 2024-06-27 ENCOUNTER — TELEPHONE (OUTPATIENT)
Dept: OBSTETRICS AND GYNECOLOGY | Facility: CLINIC | Age: 34
End: 2024-06-27
Payer: MEDICAID

## 2024-06-27 ENCOUNTER — HOSPITAL ENCOUNTER (EMERGENCY)
Facility: OTHER | Age: 34
Discharge: HOME | End: 2024-06-28
Attending: EMERGENCY MEDICINE
Payer: MEDICAID

## 2024-06-27 DIAGNOSIS — O03.4 INCOMPLETE MISCARRIAGE: Primary | ICD-10-CM

## 2024-06-27 PROCEDURE — 99284 EMERGENCY DEPT VISIT MOD MDM: CPT

## 2024-06-27 PROCEDURE — 81000 URINALYSIS NONAUTO W/SCOPE: CPT | Performed by: EMERGENCY MEDICINE

## 2024-06-28 ENCOUNTER — TELEPHONE (OUTPATIENT)
Dept: OBSTETRICS AND GYNECOLOGY | Facility: CLINIC | Age: 34
End: 2024-06-28
Payer: MEDICAID

## 2024-06-28 VITALS
HEART RATE: 75 BPM | TEMPERATURE: 98 F | OXYGEN SATURATION: 100 % | BODY MASS INDEX: 36.96 KG/M2 | RESPIRATION RATE: 17 BRPM | WEIGHT: 230 LBS | DIASTOLIC BLOOD PRESSURE: 71 MMHG | SYSTOLIC BLOOD PRESSURE: 121 MMHG | HEIGHT: 66 IN

## 2024-06-28 LAB
BACTERIA #/AREA URNS HPF: ABNORMAL /HPF
BILIRUB UR QL STRIP: NEGATIVE
CLARITY UR: CLEAR
COLOR UR: COLORLESS
GLUCOSE UR QL STRIP: NEGATIVE
HGB UR QL STRIP: ABNORMAL
HYALINE CASTS #/AREA URNS LPF: 0 /LPF
KETONES UR QL STRIP: NEGATIVE
LEUKOCYTE ESTERASE UR QL STRIP: ABNORMAL
MICROSCOPIC COMMENT: ABNORMAL
NITRITE UR QL STRIP: NEGATIVE
PH UR STRIP: 8 [PH] (ref 5–8)
PROT UR QL STRIP: ABNORMAL
RBC #/AREA URNS HPF: >100 /HPF (ref 0–4)
SP GR UR STRIP: 1.02 (ref 1–1.03)
SQUAMOUS #/AREA URNS HPF: 3 /HPF
URN SPEC COLLECT METH UR: ABNORMAL
UROBILINOGEN UR STRIP-ACNC: NEGATIVE EU/DL
WBC #/AREA URNS HPF: 10 /HPF (ref 0–5)

## 2024-06-28 PROCEDURE — 96361 HYDRATE IV INFUSION ADD-ON: CPT

## 2024-06-28 PROCEDURE — 63600175 PHARM REV CODE 636 W HCPCS: Performed by: INTERNAL MEDICINE

## 2024-06-28 PROCEDURE — 96374 THER/PROPH/DIAG INJ IV PUSH: CPT

## 2024-06-28 PROCEDURE — 25000003 PHARM REV CODE 250: Performed by: EMERGENCY MEDICINE

## 2024-06-28 RX ORDER — SODIUM CHLORIDE 9 MG/ML
1000 INJECTION, SOLUTION INTRAVENOUS
Status: COMPLETED | OUTPATIENT
Start: 2024-06-28 | End: 2024-06-28

## 2024-06-28 RX ORDER — ACETAMINOPHEN 500 MG
1000 TABLET ORAL
Status: COMPLETED | OUTPATIENT
Start: 2024-06-28 | End: 2024-06-28

## 2024-06-28 RX ORDER — ONDANSETRON HYDROCHLORIDE 2 MG/ML
4 INJECTION, SOLUTION INTRAVENOUS
Status: COMPLETED | OUTPATIENT
Start: 2024-06-28 | End: 2024-06-28

## 2024-06-28 RX ORDER — ONDANSETRON 4 MG/1
4 TABLET, ORALLY DISINTEGRATING ORAL
Status: DISCONTINUED | OUTPATIENT
Start: 2024-06-28 | End: 2024-06-28

## 2024-06-28 RX ADMIN — ACETAMINOPHEN 1000 MG: 500 TABLET ORAL at 12:06

## 2024-06-28 RX ADMIN — ONDANSETRON 4 MG: 2 INJECTION INTRAMUSCULAR; INTRAVENOUS at 01:06

## 2024-06-28 RX ADMIN — SODIUM CHLORIDE 1000 ML: 9 INJECTION, SOLUTION INTRAVENOUS at 12:06

## 2024-06-28 NOTE — TELEPHONE ENCOUNTER
Called to f/u with pt after ER visit. No answer left vm    If pt calls back- can keep appt for US and with Dr. Chen as is next week to discuss further

## 2024-06-28 NOTE — ED PROVIDER NOTES
"     Source of History:  The patient    Chief complaint:  Vaginal Bleeding (Reports scant vaginal bleeding and abd cramping that started yesterday. Pt is approximately 7 weeks pregnant. Confirmed IUP. /77 in triage)      HPI:  Luisana Rizo is a 33 y.o. female who is proximally 7 weeks  with vaginal bleeding.  Started spotting yesterday and increased bleeding today.  Having some mild lower abdominal cramping.  She had her initial ultrasound about 1 week ago which showed a gestational sac and yolk sac but only a very low heart rate.  Recommended repeat ultrasound in 1-2 weeks which outpatient has not scheduled for another week.  Denies any fevers or chills or dysuria.    This is the extent to the patients complaints today here in the emergency department.    ROS:   See HPI.    Review of patient's allergies indicates:  No Known Allergies    PMH:  As per HPI and below:  Past Medical History:   Diagnosis Date    Endometriosis, unspecified     GERD (gastroesophageal reflux disease)     Pre-diabetes      Past Surgical History:   Procedure Laterality Date    LAPAROSCOPY  2018    for endometriosis    TONSILLECTOMY      VENTRAL HERNIA REPAIR         Social History     Tobacco Use    Smoking status: Never    Smokeless tobacco: Never   Substance Use Topics    Alcohol use: Not Currently     Comment: Occasionally    Drug use: No       Physical Exam:    /71 (BP Location: Left arm, Patient Position: Lying)   Pulse 75   Temp 98.3 °F (36.8 °C) (Oral)   Resp 17   Ht 5' 6" (1.676 m)   Wt 104.3 kg (230 lb)   LMP 2024 (Exact Date)   SpO2 100%   Breastfeeding No   BMI 37.12 kg/m²   Nursing note and vital signs reviewed.  Constitutional: No acute distress.  Nontoxic  Abdomen/:  Mild amount of blood in the vaginal vault and mild active bleeding.  Cervical os is closed.  No POC.  No adnexal tenderness.  Neuro: Alert. No focal deficits.  Skin: No rashes seen.     I decided to obtain the patient's medical " records.  Summary of Medical Records:  Reviewing records patient had outpatient blood work as well as initial ultrasound 1 week ago on 06/20/2024.  Transabdominal as well as vaginal ultrasound showed gestational sac and yolk sac with fetal cardiac activity although it was noted to be at 73 beats per minute.    Also reviewing records patient is Rh positive.  No indication for RhoGAM.      Differential Dx/ MDM/ Workup:  Patient has already had a previous ultrasound confirming IUP piece in showed no evidence of ectopic.  However with a low fetal heart rate I feel it is reasonable to get a ultrasound this evening to give evaluation of failed pregnancy versus continued pregnancy.  She has a scheduled outpatient ultrasound next week but this can be prevented if ultrasound this evening.  Do not feel any blood work is indicated.  Will get a urinalysis.      ED Course as of 06/28/24 1532   Fri Jun 28, 2024   0013 NITRITE UA: Negative [SM]   0013 Leukocyte Esterase, UA(!): Trace [SM]   0013 WBC, UA(!): 10 [SM]   0013 RBC, UA(!): >100  No evidence of infection as well as no symptoms [SM]   0106 Updated the patient on the suspected incomplete miscarriage.  Also discussed with OB resident, Dr. Rivera discussed the case.  She recommended continue the follow up appointment with Dr. Chen on Wednesday.    Will sign out to Dr. Patricio the former reading and then plan for discharge.  Answered all questions to the patient and she agrees with the plan of care. [SM]   0134 Formal read consistent with pregnancy of unknown viability recommended serial beta-hCGs and repeat pelvic ultrasound in 1-2 weeks.  Will continue with plan for discharge and follow up with Dr. Yates on Wednesday.  []      ED Course User Index  [] Josephine Patricio MD  [SM] Saurav Stoner, DO               Diagnostic Impression:    1. Incomplete miscarriage         ED Disposition Condition    Discharge Stable            ED Prescriptions    None        Follow-up Information       Follow up With Specialties Details Why Contact Info    Trace Chen MD Obstetrics and Gynecology  at your appointment on July 3rd. [address]  [address]  [facility] [address]  [phone]      [facility] - Emergency Dept Emergency Medicine  if bleeding significantly worsens. 1 pad/hour for 3 hours or feeling faint, or weak [address] [facility] [id number] [phone]             Saurav Stoner, DO  06/28/24 0107       Saurav Stoner, DO  06/28/24 1532

## 2024-06-29 ENCOUNTER — NURSE TRIAGE (OUTPATIENT)
Dept: ADMINISTRATIVE | Facility: CLINIC | Age: 34
End: 2024-06-29
Payer: MEDICAID

## 2024-06-29 ENCOUNTER — PATIENT MESSAGE (OUTPATIENT)
Dept: URGENT CARE | Facility: CLINIC | Age: 34
End: 2024-06-29
Payer: MEDICAID

## 2024-06-30 NOTE — TELEPHONE ENCOUNTER
Pt calls and reports miscarriage on Friday morning. Pt reports lower abd pain and lower back pain that is not controlled by ibuprofen 800 mg. Pt reports tissue being passed. Pain was controlled by ibuprofen yesterday but today it is worse. Reports that vaginal bleeding is improved.     Dispo is to see HCP within 4 hours. UC/ED recommended as a good source of care. OD VV offered and accepted and patient assisted into queue.   Reason for Disposition   [1] Constant abdominal pain AND [2] present > 2 hours    Additional Information   Negative: Shock suspected (e.g., cold/pale/clammy skin, too weak to stand, low BP, rapid pulse)   Negative: Difficult to awaken or acting confused (e.g., disoriented, slurred speech)   Negative: Passed out (e.g., fainted, lost consciousness, blacked out and was not responding)   Negative: Sounds like a life-threatening emergency to the triager   Negative: SEVERE vaginal bleeding (e.g., soaking 2 pads or tampons per hour and present 2 or more hours; 1 menstrual cup every 2 hours)   Negative: MODERATE vaginal bleeding (e.g., soaking 1 pad or tampon per hour and present > 6 hours; 1 menstrual cup every 6 hours)   Negative: SEVERE dizziness (e.g., unable to stand, requires support to walk, feels like passing out)   Negative: [1] Abdomen pain AND [2] fever 100.4 F (38.0 C) or higher   Negative: SEVERE abdominal pain (e.g., excruciating)     Intermittent; cramping with baseline as 4/10 and increased to 10/10 with cramp   Negative: Severe chills (i.e., feeling extremely cold WITH shaking chills)   Negative: Pale skin (pallor) of new-onset or worsening   Negative: Patient sounds very sick or weak to the triager    Protocols used:  - Spontaneous Miscarriage Follow-up Call-A-

## 2024-07-01 ENCOUNTER — TELEPHONE (OUTPATIENT)
Dept: OBSTETRICS AND GYNECOLOGY | Facility: CLINIC | Age: 34
End: 2024-07-01
Payer: MEDICAID

## 2024-07-01 NOTE — TELEPHONE ENCOUNTER
----- Message from Saqib Hammond sent at 7/1/2024  8:43 AM CDT -----      Name of Who is Calling: HERO KELLEY [[id number]]      What is the request in detail: pt called to get appt's cleared due to miscarriage and pt [facility] if [facility].Please contact to further discuss and advise.          Can the clinic reply by [facility]: Y      What Number to Call Back if not in [facility]:  [phone]

## 2024-07-02 ENCOUNTER — OFFICE VISIT (OUTPATIENT)
Dept: OBSTETRICS AND GYNECOLOGY | Facility: CLINIC | Age: 34
End: 2024-07-02
Payer: MEDICAID

## 2024-07-02 VITALS
DIASTOLIC BLOOD PRESSURE: 87 MMHG | BODY MASS INDEX: 36.92 KG/M2 | WEIGHT: 228.75 LBS | SYSTOLIC BLOOD PRESSURE: 128 MMHG

## 2024-07-02 DIAGNOSIS — O03.9 MISCARRIAGE: Primary | ICD-10-CM

## 2024-07-02 PROCEDURE — 99999 PR PBB SHADOW E&M-EST. PATIENT-LVL III: CPT | Mod: PBBFAC,,, | Performed by: OBSTETRICS & GYNECOLOGY

## 2024-07-02 PROCEDURE — 99213 OFFICE O/P EST LOW 20 MIN: CPT | Mod: PBBFAC,TH,PN | Performed by: OBSTETRICS & GYNECOLOGY

## 2024-07-02 RX ORDER — CITALOPRAM 10 MG/1
10 TABLET ORAL DAILY
COMMUNITY

## 2024-07-03 RX ORDER — PROGESTERONE 200 MG/1
400 CAPSULE ORAL NIGHTLY
Qty: 60 CAPSULE | Refills: 2 | Status: SHIPPED | OUTPATIENT
Start: 2024-07-03 | End: 2024-10-01

## 2024-07-03 NOTE — PROGRESS NOTES
GYN follow up  2024    Chief Complaint   Patient presents with    Follow-up         HISTORY OF PRESENT ILLNESS:  Pt is a  33 y.o.  alert female who presents today for GYN follow-up for miscarriage.    She had a ultrasound on 2024 that showed she was 6 weeks 0 days with a fetal heart rate of 73 beats per minute.  She subsequently started having spotting and presented on 2024 and ultrasound showed a gestational sac with a yolk sac but no fetal heart rate and was diagnosed with a miscarriage.  Shortly after that ultrasound she had passage of tissue and clots and is quite certain she saw pregnancy tissue.  She had heavier bleeding yesterday but today it is mild spotting.  She denies any unusual vaginal discharge, vaginal odor, pelvic pain, fevers.  She is appropriately sad regarding her miscarriage as she has been trying to conceive for many years.    She presents with her mother.  Her mother notes that she had progesterone deficiency and in order to conceive had to take progesterone when she was attempting conception.  She asks if her daughter can do the same.    She had laparoscopy for endometriosis in 2018. Chromopertubation was performed at that time and showed both tubes with spillage of dye.   She has menses every month lasting 3-5 days with moderate flow since 2023. She has mild dysmenorrhea.     Patient's last menstrual period was 2024 (exact date).    Review of patient's allergies indicates:  No Known Allergies    Current Outpatient Medications on File Prior to Visit   Medication Sig Dispense Refill    citalopram (CELEXA) 10 MG tablet Take 10 mg by mouth once daily.      butalbital-acetaminophen-caffeine -40 mg (FIORICET, ESGIC) -40 mg per tablet Take 1 tablet by mouth every 12 (twelve) hours as needed. (Patient not taking: Reported on 3/26/2024) 30 tablet 1    prenatal vit no.124/iron/folic (PRENATAL VITAMIN ORAL) Take 1 Dose by mouth Daily. (Patient not  "taking: Reported on 2024)       No current facility-administered medications on file prior to visit.       Past Medical History:   Diagnosis Date    Endometriosis, unspecified     GERD (gastroesophageal reflux disease)     Pre-diabetes             Past Surgical History:   Procedure Laterality Date    LAPAROSCOPY  2018    for endometriosis    TONSILLECTOMY      VENTRAL HERNIA REPAIR         Social History     Socioeconomic History    Marital status: Single   Tobacco Use    Smoking status: Never    Smokeless tobacco: Never   Substance and Sexual Activity    Alcohol use: Not Currently     Comment: Occasionally    Drug use: No    Sexual activity: Yes     Partners: Male     Birth control/protection: None                     Family History   Problem Relation Name Age of Onset    Breast cancer Neg Hx      Colon cancer Neg Hx      Ovarian cancer Neg Hx         OB History    Para Term  AB Living   2 0 0 0 2 0   SAB IAB Ectopic Multiple Live Births   2 0 0 0 0      # Outcome Date GA Lbr Luis Felipe/2nd Weight Sex Type Anes PTL Lv   2 SAB 26     SAB      1 SAB 2023              Birth Comments: CHEMICAL PREGNANCY     Gynecological History:     Menses as above.  Denies hx of STD"s.  History of ASCUS/HPV negative.  3/26/24: NILM/HPV negative    REVIEW OF SYSTEMS:  Negative except as above.     PHYSICAL EXAM  Female chaperone present for entire exam  /87   Wt 103.8 kg (228 lb 11.6 oz)   LMP 2024 (Exact Date)   Breastfeeding Unknown   BMI 36.92 kg/m²   GENERAL APPEARANCE:  The patient is a pleasant, normal appearing female with normal affect and in no distress.  SKIN:  Warm and dry to touch.  No lesions or rashes noted.    PSYCHIATRIC/NEUROLOGIC:  Appropriate mood and affect, normal recall, alert and oriented x 3  EXTREMITIES:  Warm and well perfused.  No edema noted.   :  Vulva: Inspection of her external genitalia reveals normal mons pubis, labia minora and labia majora.  Normal appearing " clitoris, urethral meatus and [place]'s glands.    Bladder:  No evidence of urethral or bladder tenderness.    Vagina:  Speculum exam reveals pink and moist vaginal mucosa.    Cervix:  Cervix is normal in appearance with no lesions.  Scant vaginal bleeding coming from the os.  No clots no heavy bleeding.  Perineum:  Perineum appears normal.  Anus:  Normal with no apparent lesions.     ASSESSMENT/PLAN:   33 y.o.  alert female who presents today for GYN follow-up for miscarriage. She has a history of primary infertility.     Miscarriage counseling:  - Condolences provided today. Reviewed about 20% of pregnancies result in miscarriage.  - We also discussed etiology of spontaneous   - We discussed that this is most commonly caused by chromosomal abnormalities in the embryo or exposure to teratogens. It is often difficult to determine the cause of a spontaneous  in an individual case.   - Chromosomal abnormalities account for approximately 50 percent of all miscarriages. Most such abnormalities are aneuploidies.   - The earlier  occurs, the higher the incidence of cytogenetic defects  - recommended pelvic ultrasound to ensure no retained products of conception  I asked her to send me a urine pregnancy test in 3-4 weeks to ensure the miscarriage has completely resolved.    Primary infertility: History of endometriosis. Patent fallopian tubes in 2018 on laparoscopy.  She can see SHAUNA if she desires.    Her 1st pregnancy loss was a chemical pregnancy and this was A positive at home pregnancy test that follow-up was negative.  Discussed that at this point would hold off on recurrent pregnancy loss workup.  If she has another miscarriage with confirmed IUP will start recurrent pregnancy loss workup.  She and her mother inquire about progesterone and whether this is needed to improve her chances of carrying a pregnancy and decreasing the chance of miscarriage.  We reviewed the data that shows that  progesterone supplementation in the 1st trimester has mixed data with some studies showing it has no effect and other showing a potential mild benefit.  Nonetheless it is unlikely to cause harm and the patient strongly desires to take progesterone. She will start Prometrium 400 mg vaginally at night once she has a positive pregnancy test.    Continue prenatal vitamins    Pt voiced understanding of all counseling and instructions; no barriers to learning  RTO in 1 year or PRN        Trace Chen  7/2/2024

## 2024-07-10 ENCOUNTER — TELEPHONE (OUTPATIENT)
Dept: OBSTETRICS AND GYNECOLOGY | Facility: CLINIC | Age: 34
End: 2024-07-10
Payer: MEDICAID

## 2024-07-10 NOTE — TELEPHONE ENCOUNTER
Lm for patient to see us in clinic tomorrow per     ----- Message from Trace Chen MD sent at 7/10/2024 12:26 PM CDT -----  Please offer her a visit with me tomorrow morning at [facility] ( you can over book) or 11:15 AM at [place] on Friday.     Trace Chen  ----- Message -----  From: Stacy Veloz MA  Sent: 7/10/2024  11:33 AM CDT  To: Trace Chen MD    Hemarcelo Flowers, this patient recently miscarried and isn't doing too good emotionally. Can you reach out to her? I've tried to schedule a virtual appointment but the schedules are locked up. Let me know what you would like for me to do.    Mo

## 2024-07-11 ENCOUNTER — TELEPHONE (OUTPATIENT)
Dept: OBSTETRICS AND GYNECOLOGY | Facility: CLINIC | Age: 34
End: 2024-07-11
Payer: MEDICAID

## 2024-07-11 NOTE — TELEPHONE ENCOUNTER
Attempted to call patient to check in with her and see how she was feeling.  Received message that she had concerns after her miscarriage.    Unable to reach patient's voicemail was left asking her to contact my office or message me through the portal.  Phone number provided.    Trace Chen  7/11/2024

## 2024-07-12 ENCOUNTER — OFFICE VISIT (OUTPATIENT)
Dept: OBSTETRICS AND GYNECOLOGY | Facility: CLINIC | Age: 34
End: 2024-07-12
Payer: MEDICAID

## 2024-07-12 DIAGNOSIS — F32.A DEPRESSION, UNSPECIFIED DEPRESSION TYPE: Primary | ICD-10-CM

## 2024-07-12 RX ORDER — CITALOPRAM 20 MG/1
20 TABLET, FILM COATED ORAL DAILY
Qty: 30 TABLET | Refills: 2 | Status: SHIPPED | OUTPATIENT
Start: 2024-07-12 | End: 2025-07-12

## 2024-07-13 NOTE — PROGRESS NOTES
The patient location is: [place]  Date: 7/12/2024    The chief complaint leading to consultation is: depression following miscarriage     Visit type: audiovisual    Subjective:     HPI:  33-year-old status post recent first-trimester miscarriage.  She had been recovering well but noticed on Monday she started having mild depression.  She notes she had trouble sleeping and did not want to leave the house much.  She notes that waking up was difficult for her.  She denies any suicidal or homicidal ideation.  She is on Lexapro 10 mg for treatment of depression.  She has been prescribed Klonopin by her PCP in the past but is out of this.    She started walking and exercising more and also had some promethazine from a prior prescription that she took.  All of these improved her symptoms and she was able to rest and is starting to feel better.  However her mood is still somewhat depressed.    Assessment and plan:  Depression following miscarriage:  Increase Celexa to 20 mg daily.  Follow up with primary care provider for refills on Klonopin.  Denies suicidal or homicidal ideation.  Reviewed precautions and when to contact my office or present to the emergency room for care if her mood worsens or she has any suicidal or homicidal ideation.  Discussed option of referral to counselor she would like to start this.  Behavioral health referral placed.     Face to Face time with patient: 11 minutes  15 minutes of total time spent on the encounter, which includes face to face time and non-face to face time preparing to see the patient (eg, review of tests), Obtaining and/or reviewing separately obtained history, Documenting clinical information in the electronic or other health record, Independently interpreting results (not separately reported) and communicating results to the patient/family/caregiver, or Care coordination (not separately reported).      Each patient to whom he or she provides medical services by telemedicine  is:  (1) informed of the relationship between the physician and patient and the respective role of any other health care provider with respect to management of the patient; and (2) notified that he or she may decline to receive medical services by telemedicine and may withdraw from such care at any time.        Trace Chen  7/12/2024

## 2024-07-19 ENCOUNTER — TELEPHONE (OUTPATIENT)
Dept: PSYCHOLOGY | Facility: CLINIC | Age: 34
End: 2024-07-19
Payer: MEDICAID

## 2024-07-19 NOTE — TELEPHONE ENCOUNTER
Called and spoke with pt, she is going to call back and schedule an appointment, she is checking her schedule to schedule with provider.  Provided her the direct line to myself. Pt had no further questions.

## 2024-07-30 ENCOUNTER — PATIENT MESSAGE (OUTPATIENT)
Dept: OBSTETRICS AND GYNECOLOGY | Facility: CLINIC | Age: 34
End: 2024-07-30
Payer: MEDICAID

## 2024-08-21 ENCOUNTER — TELEPHONE (OUTPATIENT)
Dept: DIABETES | Facility: CLINIC | Age: 34
End: 2024-08-21
Payer: MEDICAID

## 2024-08-21 ENCOUNTER — PATIENT MESSAGE (OUTPATIENT)
Dept: DIABETES | Facility: CLINIC | Age: 34
End: 2024-08-21
Payer: MEDICAID

## 2024-08-21 NOTE — TELEPHONE ENCOUNTER
----- Message from Leia Queen sent at 8/21/2024  3:28 PM CDT -----  Type:  Needs Medical Advice    Who Called: pt  Symptoms (please be specific): pt is wanting to be schedule with the NP instead of [place] please call back to schedule appt      Would the patient rather a call back or a response via [facility]? call  Best Call Back Number:  [phone]  Additional Information:

## 2024-08-22 ENCOUNTER — TELEPHONE (OUTPATIENT)
Dept: DIABETES | Facility: CLINIC | Age: 34
End: 2024-08-22
Payer: MEDICAID

## 2024-08-22 NOTE — TELEPHONE ENCOUNTER
----- Message from Sheree Veloz sent at 8/22/2024  9:27 AM CDT -----  Regarding: Missed call  Type:  Patient Returning Call         Who Called:  PT         Who Left Message for Patient: Darlene Bradley MA         Does the patient know what this is regarding?: Yes         Would the patient rather a call back or a response via [facility]?  Call back          Best Call Back Number:[phone]         Additional Information:Please call anytime after 10am

## 2024-08-28 ENCOUNTER — PATIENT MESSAGE (OUTPATIENT)
Dept: DIABETES | Facility: CLINIC | Age: 34
End: 2024-08-28
Payer: MEDICAID

## 2024-09-09 ENCOUNTER — PATIENT MESSAGE (OUTPATIENT)
Dept: ENDOCRINOLOGY | Facility: CLINIC | Age: 34
End: 2024-09-09
Payer: MEDICAID

## 2024-09-09 ENCOUNTER — OFFICE VISIT (OUTPATIENT)
Facility: CLINIC | Age: 34
End: 2024-09-09
Payer: MEDICAID

## 2024-09-09 DIAGNOSIS — N64.3 GALACTORRHEA: ICD-10-CM

## 2024-09-09 DIAGNOSIS — E22.1 HYPERPROLACTINEMIA: Primary | ICD-10-CM

## 2024-09-09 DIAGNOSIS — N91.2 AMENORRHEA: ICD-10-CM

## 2024-09-09 PROCEDURE — 99204 OFFICE O/P NEW MOD 45 MIN: CPT | Mod: 95,,, | Performed by: STUDENT IN AN ORGANIZED HEALTH CARE EDUCATION/TRAINING PROGRAM

## 2024-09-09 PROCEDURE — 1159F MED LIST DOCD IN RCRD: CPT | Mod: CPTII,95,, | Performed by: STUDENT IN AN ORGANIZED HEALTH CARE EDUCATION/TRAINING PROGRAM

## 2024-09-09 PROCEDURE — G2211 COMPLEX E/M VISIT ADD ON: HCPCS | Mod: 95,,, | Performed by: STUDENT IN AN ORGANIZED HEALTH CARE EDUCATION/TRAINING PROGRAM

## 2024-09-09 PROCEDURE — 3044F HG A1C LEVEL LT 7.0%: CPT | Mod: CPTII,95,, | Performed by: STUDENT IN AN ORGANIZED HEALTH CARE EDUCATION/TRAINING PROGRAM

## 2024-09-09 NOTE — PROGRESS NOTES
ENDOCRINOLOGY NEW PATIENT VISIT: 09/09/2024    The patient location is: Home  The chief complaint leading to consultation is: Prolactin issues    Visit type: audiovisual    Face to Face time with patient: 30  45 minutes of total time spent on the encounter, which includes face to face time and non-face to face time preparing to see the patient (eg, review of tests), Obtaining and/or reviewing separately obtained history, Documenting clinical information in the electronic or other health record, Independently interpreting results (not separately reported) and communicating results to the patient/family/caregiver, or Care coordination (not separately reported).     Each patient to whom he or she provides medical services by telemedicine is:  (1) informed of the relationship between the physician and patient and the respective role of any other health care provider with respect to management of the patient; and (2) notified that he or she may decline to receive medical services by telemedicine and may withdraw from such care at any time.      Subjective:      Patient ID: Luisana Rizo is a 34 y.o. female.    Chief Complaint:  prolactin (Prolactin levels high)    History of Present Illness  Luisana Rizo presents for evaluation of elevated prolactin levels.  She admits to a history of prolactin elevation for the past 10 years.  Has never been evaluated by an endocrinologist for this.  Levels first found to be elevated when she noticed discharge coming from her breasts and prolactin then checked.  Followed with OBGYN and plastic surgeon at that time.  They made sure it was not related to an infection.  She had MRI of the brain at that time but was not told of any abnormalities with her pituitary gland.    Recently her father purchased over the counter Cabergoline in [place] for which she took 2 tablets spaced apart in time with the last dose being about 2 weeks ago.  She has continued to have galactorrhea despite this.   Fertility issues have been her concern recently with problems becoming pregnant despite other work up for her and her partner being non concerning.  Had chemical pregnancy last year and recent miscarriage about 2 months ago.  No other pregnancies which she does desire at this time.  Also noted history of endometriosis.  Labs completed earlier in the year with her OBGYN showing prolactin levels higher than had been in the past prompting her referral.        Regarding prolactinoma or elevated prolactin:      - Initial presentation:  Had galactorrhea in the past around 10 years ago prompting prolactin levels to be checked.  Had MRI pituitary which was reportedly not abnormal (no records from this).  Issues with fertility since then and prolactin progressively increasing .          - MRI/CT:   Last was at least 10 years ago (outside of [facility]).       - Formal [place] Visual Fields (HVF):  None, no visual complaints    - No interfering medicine from her list       Latest Reference Range & Units 11/01/13 11:50 04/22/17 10:32 06/09/18 10:35 03/26/24 09:55   TSH 0.400 - 4.000 uIU/mL  1.080 1.400 1.045   Prolactin 5.2 - 26.5 ng/mL 63.0 (H)   105.1 (H)   (H): Data is abnormally high      Current Outpatient Medications:     citalopram (CELEXA) 20 MG tablet, Take 1 tablet (20 mg total) by mouth once daily., Disp: 30 tablet, Rfl: 2    prenatal vit no.124/iron/folic (PRENATAL VITAMIN ORAL), Take 1 Dose by mouth Daily. (Patient not taking: Reported on 7/2/2024), Disp: , Rfl:     progesterone (PROMETRIUM) 200 MG capsule, Place 2 capsules (400 mg total) vaginally nightly., Disp: 60 capsule, Rfl: 2     - Pertinent hyperprolactinemia symptoms:  No   Yes  []    [x]  Headaches (chronic - will take Ibuprofen)  [x]    []  Loss of peripheral vision  []    [x]  Galactorrhea  [x]    []  Breast Tenderness  []    [x]  Palpitations (at least 1-2 times a week, possibly anxiety)  [x]    []  Altered Menses  []    [x]  Infertility  issues    Menstrual cycles are now normal  Chemical pregnancy last year  This year she had miscarriage after 2 months    - Adrenal insufficiency symptoms:  No   Yes  [x]    []  Hypotension  [x]    []  Nausea/vomiting/abdominal pain  [x]    []  Lightheadedness    - Diabetes Insipidus symptoms:  No   Yes  [x]    []  Polyuria  [x]    []  Polydipsia    - Thyroid symptoms:  No   Yes  []    [x]  Fatigue  []    [x]  Weight Change (Varies up and down)  []    [x]  Temperature Intolerance (hot at night)  [x]    []  Changes in Bowel Habits  []    [x]  Skin/hair Change (dry skin on her hands)  []    [x]  Palpitations  [x]    []  Tremor    Lab Results   Component Value Date    TSH 1.045 03/26/2024         - Growth Hormone Excess symptoms:  No   Yes  [x]    []  Increased hand or foot size  [x]    []  Increased teeth spacing  []    [x]  Worsened glycemic control (pre-diabetes history)  [x]    []  Joint pain  [x]    []  Hyperhidrosis     Lab Results   Component Value Date    HGBA1C 5.7 (H) 06/20/2024       ROS:   As above    Objective:     There were no vitals taken for this visit.  BP Readings from Last 3 Encounters:   07/02/24 128/87   06/28/24 121/71   06/20/24 110/82     Wt Readings from Last 1 Encounters:   07/02/24 1417 103.8 kg (228 lb 11.6 oz)     There is no height or weight on file to calculate BMI.    Physical Exam  Constitutional:       Appearance: Normal appearance.   Neurological:      Mental Status: She is alert.   Psychiatric:         Mood and Affect: Mood normal.         Behavior: Behavior normal.      Lab Review:   Lab Results   Component Value Date    HGBA1C 5.7 (H) 06/20/2024     Lab Results   Component Value Date    CHOL 167 06/09/2018    HDL 53 06/09/2018    LDLCALC 96 06/09/2018    TRIG 91 06/09/2018     Lab Results   Component Value Date     06/03/2023    K 3.9 06/03/2023     06/03/2023    CO2 22 (L) 06/03/2023     (H) 06/03/2023    BUN 9 06/03/2023    CREATININE 0.7 06/03/2023    CALCIUM  9.3 06/03/2023    PROT 7.5 06/03/2023    ALBUMIN 4.3 06/03/2023    BILITOT 0.5 06/03/2023    ALKPHOS 87 06/03/2023    AST 13 06/03/2023    ALT 14 06/03/2023    ANIONGAP 11 06/03/2023    ESTGFRAFRICA >60 12/06/2009    EGFRNONAA 123 06/09/2018    TSH 1.045 03/26/2024     Vit D, 25-Hydroxy   Date Value Ref Range Status   06/09/2018 11.9 (L) 30.0 - 100.0 ng/mL Final     Comment:     Vitamin D deficiency has been defined by the [place] of  Medicine and an Endocrine Society practice guideline as a  level of serum 25-OH vitamin D less than 20 ng/mL (1,2).  The Endocrine Society went on to further define vitamin D  insufficiency as a level between 21 and 29 ng/mL (2).  1. IOM ([place] of [employer]). 2010. Dietary reference     intakes for calcium and D. [place]: The     National Academies Press.  2. Thom MF, Ken SLATER, Kasandra MCCARTHY, et al.     Evaluation, treatment, and prevention of vitamin D     deficiency: an Endocrine Society clinical practice     guideline. JCEM. 2011 Jul; 96(7):1911-30.         Assessment and Plan     Amenorrhea  Issues in the past with amenorrhea but this has improved to now having regular cycles.  Now having issues with infertility.      Galactorrhea  Issues ongoing for at least 10 years in setting of known prolactin elevation.  Evaluated in the past by OBGYN who obtained a breast US which was not concerning.  Also seen by plastic surgery years ago but no intervention needed then.  Plan as stated - see hyperprolactinemia    Hyperprolactinemia  Chart reviewed along with labs and history.  Ongoing issues with galactorrhea for many years with known prolactin elevations.  Now being seen for evaluation due to recent worsening in prolactin levels and also concerns for fertility issues over the years.  Desires pregnancy and despite having cycles be monthly she has had trouble conceiving.  No specific symptoms to suggestive other hormonal abnormalities.  No symptoms to suggestive large  lesion in the brain such as daily headaches or vision issues.      Reviewed indications for treatment and likely regimen should levels still be elevated.  Also reviewed that given no prior MRI to review that we may need to repeat scan at this time.   Prior thyroid testing earlier in the year was normal and not concerning.  No interfering meds to cause prolactin elevations.      Plan  - Repeat prolactin and add IGF-1 level for a morning fasting lab draw soon  - If prolactin remains elevated and other labs do not show causes of hyperprolactinemia, proceed with pituitary MRI.    - Discussed natural history of prolactinoma and treatment options. Reviewed mild hyperprolactinemia with negative pituitary MRI (idiopathic hyperprolactinemia).  Would likely still treat given symptoms and pregnancy desire  - Reviewed that if treatment is to be started and she then becomes pregnant that we would hold cabergoline at that time as the prolactin is expected to naturally rise during pregnancy      RTC in 1 year.  Labs soon with possible MRI following that.       **Visit today included increased complexity associated with the care of the problems addressed and managing the longitudinal care of the patient due to the serious and/or complex managed problems      Oscar Bliss DO     Disclaimer: This note has been generated using voice-recognition software. There may be typographical errors that have been missed during proof-reading.    The above history labs imaging impression and plan were discussed with attending physician who is in agreement and also took part in this patient's care.  I personally reviewed all of the patients available medications, labs, imaging, vitals, allergies, medical history.

## 2024-09-09 NOTE — ASSESSMENT & PLAN NOTE
Issues ongoing for at least 10 years in setting of known prolactin elevation.  Evaluated in the past by OBGYN who obtained a breast US which was not concerning.  Also seen by plastic surgery years ago but no intervention needed then.  Plan as stated - see hyperprolactinemia

## 2024-09-09 NOTE — Clinical Note
Please arrange fasting morning labs for this patient in the near future.    1 year follow up for now  Thanks

## 2024-09-09 NOTE — ASSESSMENT & PLAN NOTE
Issues in the past with amenorrhea but this has improved to now having regular cycles.  Now having issues with infertility.

## 2024-09-09 NOTE — PATIENT INSTRUCTIONS
Thank you for visiting with me during our virtual appointment today.    As discussed I do want to repeat your prolactin levels at this time along with an additional hormone called IGF-1.  This is to make sure that we are still seeing the elevation of prolactin but also monitor for an additional hormone excess that can occur.  If levels are still elevated we will proceed with MRI of the pituitary gland given we do not have your prior imaging to personally review.  This will give us a baseline of how your pituitary gland looks and if there is any adenoma or growth on the pituitary gland that we can monitor over time.    If prolactin is still consistently elevated we will then look at starting cabergoline therapy twice a week.  This is the medicine that helps to lower the prolactin levels over time.  The hope would be that with normalization of the prolactin levels you will see fertility issues improve.  If we are able to maintain normal prolactin levels for multiple months and you are still having issues with fertility then you may need further follow-up with your OBGYN or even a reproductive endocrinologist if they feel that is needed.    If we see any lesion in the pituitary region on imaging we may have to obtain some other lab work as well.    Please reach out with any questions or concerns that you may have.  Expect to hear from me once I have those to lab results back.  As a reminder please plan to fast for this upcoming morning lab.

## 2024-09-09 NOTE — ASSESSMENT & PLAN NOTE
Chart reviewed along with labs and history.  Ongoing issues with galactorrhea for many years with known prolactin elevations.  Now being seen for evaluation due to recent worsening in prolactin levels and also concerns for fertility issues over the years.  Desires pregnancy and despite having cycles be monthly she has had trouble conceiving.  No specific symptoms to suggestive other hormonal abnormalities.  No symptoms to suggestive large lesion in the brain such as daily headaches or vision issues.      Reviewed indications for treatment and likely regimen should levels still be elevated.  Also reviewed that given no prior MRI to review that we may need to repeat scan at this time.   Prior thyroid testing earlier in the year was normal and not concerning.  No interfering meds to cause prolactin elevations.      Plan  - Repeat prolactin and add IGF-1 level for a morning fasting lab draw soon  - If prolactin remains elevated and other labs do not show causes of hyperprolactinemia, proceed with pituitary MRI.    - Discussed natural history of prolactinoma and treatment options. Reviewed mild hyperprolactinemia with negative pituitary MRI (idiopathic hyperprolactinemia).  Would likely still treat given symptoms and pregnancy desire  - Reviewed that if treatment is to be started and she then becomes pregnant that we would hold cabergoline at that time as the prolactin is expected to naturally rise during pregnancy

## 2024-09-25 ENCOUNTER — PATIENT MESSAGE (OUTPATIENT)
Dept: DIABETES | Facility: CLINIC | Age: 34
End: 2024-09-25
Payer: MEDICAID

## 2024-09-25 ENCOUNTER — TELEPHONE (OUTPATIENT)
Dept: ENDOCRINOLOGY | Facility: CLINIC | Age: 34
End: 2024-09-25
Payer: MEDICAID

## 2024-09-25 DIAGNOSIS — E22.1 HYPERPROLACTINEMIA: Primary | ICD-10-CM

## 2024-09-25 RX ORDER — CABERGOLINE 0.5 MG/1
0.25 TABLET ORAL
Qty: 5 TABLET | Refills: 5 | Status: SHIPPED | OUTPATIENT
Start: 2024-09-26 | End: 2025-09-26

## 2024-09-26 ENCOUNTER — TELEPHONE (OUTPATIENT)
Dept: ENDOCRINOLOGY | Facility: CLINIC | Age: 34
End: 2024-09-26
Payer: MEDICAID

## 2024-09-26 NOTE — TELEPHONE ENCOUNTER
----- Message from Kinga Delacruz sent at 9/25/2024  2:57 PM CDT -----  Regarding: Results  Contact: [phone]  Type:  Test Results    Who Called: pt   Name of Test (Lab/Mammo/Etc): MRi  Date of Test: 9/25  Ordering Provider: Dr. Bliss   Where the test was performed: [facility]   Would the patient rather a call back or a response via [facility]? Callback   Best Call Back Number: [phone]

## 2024-10-15 ENCOUNTER — PATIENT MESSAGE (OUTPATIENT)
Dept: DIABETES | Facility: CLINIC | Age: 34
End: 2024-10-15
Payer: MEDICAID

## 2024-10-27 ENCOUNTER — PATIENT MESSAGE (OUTPATIENT)
Facility: CLINIC | Age: 34
End: 2024-10-27
Payer: MEDICAID

## 2024-10-28 ENCOUNTER — PATIENT MESSAGE (OUTPATIENT)
Dept: PRIMARY CARE CLINIC | Facility: CLINIC | Age: 34
End: 2024-10-28
Payer: MEDICAID

## 2024-10-29 ENCOUNTER — PATIENT MESSAGE (OUTPATIENT)
Dept: ENDOCRINOLOGY | Facility: CLINIC | Age: 34
End: 2024-10-29
Payer: MEDICAID

## 2024-12-02 ENCOUNTER — PATIENT MESSAGE (OUTPATIENT)
Dept: ENDOCRINOLOGY | Facility: CLINIC | Age: 34
End: 2024-12-02
Payer: MEDICAID

## 2025-01-14 PROBLEM — R06.02 SHORTNESS OF BREATH: Status: ACTIVE | Noted: 2025-01-14

## 2025-01-14 PROBLEM — J10.1 INFLUENZA A: Status: ACTIVE | Noted: 2025-01-14

## 2025-04-02 ENCOUNTER — TELEPHONE (OUTPATIENT)
Dept: OBSTETRICS AND GYNECOLOGY | Facility: CLINIC | Age: 35
End: 2025-04-02
Payer: MEDICAID

## 2025-04-02 NOTE — TELEPHONE ENCOUNTER
Reached out to pt regarding concerns.Pt stated she is having headaches, tender breasts, and cramping leading up to cycles and is very concerned as this is not normal symptoms for her. Pt would like to be seen at [facility] due to work. Pt accepted first available with FNP at the [facility] location. Pt verbalized understanding.         ----- Message from Parcel sent at 4/2/2025  1:37 PM CDT -----  Name of Who is Calling: HERO KELLEY [[id number]]What is the request in detail: Pt called requesting a call back regarding PMS and concerns.Please contact to further discuss and advise.Can the clinic reply by [facility]: YWhat Number to Call Back if not in [facility]:  [phone]

## 2025-04-09 ENCOUNTER — OFFICE VISIT (OUTPATIENT)
Dept: OBSTETRICS AND GYNECOLOGY | Facility: CLINIC | Age: 35
End: 2025-04-09
Payer: MEDICAID

## 2025-04-09 ENCOUNTER — PATIENT MESSAGE (OUTPATIENT)
Dept: OBSTETRICS AND GYNECOLOGY | Facility: CLINIC | Age: 35
End: 2025-04-09

## 2025-04-09 VITALS
BODY MASS INDEX: 38.81 KG/M2 | DIASTOLIC BLOOD PRESSURE: 95 MMHG | HEIGHT: 66 IN | RESPIRATION RATE: 16 BRPM | HEART RATE: 78 BPM | WEIGHT: 241.5 LBS | SYSTOLIC BLOOD PRESSURE: 129 MMHG

## 2025-04-09 DIAGNOSIS — N94.3 PMS (PREMENSTRUAL SYNDROME): Primary | ICD-10-CM

## 2025-04-09 PROCEDURE — 1160F RVW MEDS BY RX/DR IN RCRD: CPT | Mod: CPTII,,,

## 2025-04-09 PROCEDURE — 3008F BODY MASS INDEX DOCD: CPT | Mod: CPTII,,,

## 2025-04-09 PROCEDURE — 1159F MED LIST DOCD IN RCRD: CPT | Mod: CPTII,,,

## 2025-04-09 PROCEDURE — 99213 OFFICE O/P EST LOW 20 MIN: CPT | Mod: S$PBB,,,

## 2025-04-09 PROCEDURE — 3074F SYST BP LT 130 MM HG: CPT | Mod: CPTII,,,

## 2025-04-09 PROCEDURE — 99999 PR PBB SHADOW E&M-EST. PATIENT-LVL III: CPT | Mod: PBBFAC,,,

## 2025-04-09 PROCEDURE — 3080F DIAST BP >= 90 MM HG: CPT | Mod: CPTII,,,

## 2025-04-09 PROCEDURE — 99213 OFFICE O/P EST LOW 20 MIN: CPT | Mod: PBBFAC,PN

## 2025-04-09 RX ORDER — DROSPIRENONE AND ETHINYL ESTRADIOL 0.02-3(28)
1 KIT ORAL DAILY
Qty: 84 TABLET | Refills: 1 | Status: SHIPPED | OUTPATIENT
Start: 2025-04-09 | End: 2026-04-09

## 2025-04-09 RX ORDER — CITALOPRAM HYDROBROMIDE 20 MG
TABLET ORAL
COMMUNITY
Start: 2024-10-01 | End: 2025-04-11

## 2025-04-09 NOTE — TELEPHONE ENCOUNTER
Refill Routing Note   Medication(s) are not appropriate for processing by [facility] for the following reason(s):        No active prescription written by provider    [facility] action(s):  Defer             Appointments  past 12m or future 3m with PCP    Date Provider   Last Visit   7/12/2024 Trace Chen MD   Next Visit   Visit date not found Trace Chen MD   ED visits in past 90 days: 2        Note composed:6:10 PM 04/09/2025

## 2025-04-09 NOTE — PROGRESS NOTES
CC: Premenstrual symptoms    HPI:   Pt is a 34 y.o.  female who presents for nausea, HAs, cramping, fatigue, and breast tenderness prior to period that is worsening in severity for the last 4 months. She is currently taking celexa 20 mg daily for depression and also taking 800 mg Motrin  every 8 hours through period but not.  She has a history of hyperprolactinemia with galactorrhea.   Has Gyn Hx: endometriosis (dx'd in 2018 laparoscopic sx).     LMP: Patient's last menstrual period was 2025 (exact date).     Past Medical History:   Diagnosis Date    Endometriosis, unspecified     GERD (gastroesophageal reflux disease)     Pre-diabetes      Past Surgical History:   Procedure Laterality Date    LAPAROSCOPY  2018    for endometriosis    TONSILLECTOMY      VENTRAL HERNIA REPAIR       Family History   Problem Relation Name Age of Onset    Breast cancer Neg Hx      Colon cancer Neg Hx      Ovarian cancer Neg Hx       Social History     Socioeconomic History    Marital status: Single   Tobacco Use    Smoking status: Never    Smokeless tobacco: Never   Substance and Sexual Activity    Alcohol use: Not Currently     Comment: Occasionally    Drug use: No    Sexual activity: Yes     Partners: Male     Birth control/protection: None     Social Drivers of Health     Financial Resource Strain: Low Risk  (2024)    Overall Financial Resource Strain (CARDIA)     Difficulty of Paying Living Expenses: Not hard at all   Food Insecurity: No Food Insecurity (2024)    Hunger Vital Sign     Worried About Running Out of Food in the Last Year: Never true     Ran Out of Food in the Last Year: Never true   Physical Activity: Sufficiently Active (2024)    Exercise Vital Sign     Days of Exercise per Week: 5 days     Minutes of Exercise per Session: 110 min   Stress: Stress Concern Present (2024)    [employer] [place] - Occupational Stress Questionnaire     Feeling of Stress : To some extent    Housing Stability: Unknown (9/8/2024)    Housing Stability Vital Sign     Unable to Pay for Housing in the Last Year: No     Current Medications[1]    Review of patient's allergies indicates:  No Known Allergies     ROS:  GENERAL: Feeling well overall. Denies fever or chills.   SKIN: Denies rash or lesions.   HEAD: Denies head injury or headache.   NODES: Denies enlarged lymph nodes.   CHEST: Denies chest pain or shortness of breath.   CARDIOVASCULAR: Denies palpitations or left sided chest pain.   ABDOMEN: No abdominal pain, constipation, diarrhea, nausea, vomiting or rectal bleeding.   URINARY: No dysuria, hematuria, or burning on urination.  REPRODUCTIVE: See HPI.   BREASTS: Denies pain, lumps, or nipple discharge.   HEMATOLOGIC: No easy bruisability or excessive bleeding.   MUSCULOSKELETAL: Denies joint pain or swelling.   NEUROLOGIC: Denies syncope or weakness.   PSYCHIATRIC: Denies depression, anxiety or mood swings.    PE:  AFFECT: Calm, alert and oriented x3. Interactive during exam.  GENERAL: Appears well-nourished, well-developed, in no acute distress.  HEAD: Normocephalic, atraumatic.  THYROID: No thyromegaly.    SKIN: Normal for race, warm, & dry. No lesions or rashes.  LUNGS: Easy and unlabored.  HEART: Regular rate.  EXTREMITIES: No cyanosis, clubbing or edema. No calf tenderness.  LYMPH NODES: No axillary or inguinal adenopathy.      Diagnosis:  1. PMS (premenstrual syndrome)      Plan:   Orders Placed This Encounter    drospirenone-ethinyl estradioL (CARMEN) 3-0.02 mg per tablet     - Continue Motrin 800 mg PO as prescribed start 2 days prior to period onset.  - Continue period diary all through month with a daily entry at bedtime.   - Continue current SSRI as prescribed.  - Start Carmen NIKKO pills for 3 months. 3 month virtual visit for f/u. Explained r/b/a of Carmen. She verbalized understanding.     Follow-up in 3 months for birth control effectiveness / SSRI dosing. PRN no resolution of symptoms.      MOLLY Whitney  [facility]         [1]   Current Outpatient Medications   Medication Sig Dispense Refill    CELEXA 20 mg tablet       ibuprofen (ADVIL,MOTRIN) 800 MG tablet Take 1 tablet (800 mg total) by mouth every 6 (six) hours as needed for Pain. 20 tablet 0    drospirenone-ethinyl estradioL (CARMEN) 3-0.02 mg per tablet Take 1 tablet by mouth once daily. 84 tablet 1     No current facility-administered medications for this visit.

## 2025-04-11 RX ORDER — CITALOPRAM 20 MG/1
20 TABLET, FILM COATED ORAL
Qty: 30 TABLET | Refills: 2 | Status: SHIPPED | OUTPATIENT
Start: 2025-04-11

## 2025-05-05 ENCOUNTER — PATIENT MESSAGE (OUTPATIENT)
Dept: OBSTETRICS AND GYNECOLOGY | Facility: CLINIC | Age: 35
End: 2025-05-05
Payer: MEDICAID

## 2025-07-07 RX ORDER — CITALOPRAM 20 MG/1
20 TABLET ORAL
Qty: 90 TABLET | Refills: 0 | Status: SHIPPED | OUTPATIENT
Start: 2025-07-07

## 2025-07-07 NOTE — TELEPHONE ENCOUNTER
Refill Routing Note   Medication(s) are not appropriate for processing by [facility] for the following reason(s):        Patient not seen by provider within 15 months    [facility] action(s):  Defer             Appointments  past 12m or future 3m with PCP    Date Provider   Last Visit   Visit date not found Estephania Montejo MD   Next Visit   Visit date not found Estephania Montejo MD   ED visits in past 90 days: 0        Note composed:6:02 PM 07/07/2025